# Patient Record
Sex: FEMALE | Race: WHITE | HISPANIC OR LATINO | URBAN - METROPOLITAN AREA
[De-identification: names, ages, dates, MRNs, and addresses within clinical notes are randomized per-mention and may not be internally consistent; named-entity substitution may affect disease eponyms.]

---

## 2023-07-31 ENCOUNTER — APPOINTMENT (OUTPATIENT)
Age: 43
End: 2023-07-31
Payer: COMMERCIAL

## 2023-07-31 ENCOUNTER — OFFICE VISIT (OUTPATIENT)
Age: 43
End: 2023-07-31
Payer: COMMERCIAL

## 2023-07-31 VITALS
HEART RATE: 67 BPM | OXYGEN SATURATION: 97 % | RESPIRATION RATE: 16 BRPM | BODY MASS INDEX: 28.24 KG/M2 | TEMPERATURE: 97 F | DIASTOLIC BLOOD PRESSURE: 70 MMHG | SYSTOLIC BLOOD PRESSURE: 110 MMHG | HEIGHT: 63 IN | WEIGHT: 159.4 LBS

## 2023-07-31 DIAGNOSIS — M25.551 RIGHT HIP PAIN: ICD-10-CM

## 2023-07-31 DIAGNOSIS — M25.551 RIGHT HIP PAIN: Primary | ICD-10-CM

## 2023-07-31 PROCEDURE — 99213 OFFICE O/P EST LOW 20 MIN: CPT | Performed by: PHYSICIAN ASSISTANT

## 2023-07-31 PROCEDURE — 73502 X-RAY EXAM HIP UNI 2-3 VIEWS: CPT

## 2023-07-31 RX ORDER — MELOXICAM 15 MG/1
15 TABLET ORAL DAILY
Qty: 30 TABLET | Refills: 1 | Status: SHIPPED | OUTPATIENT
Start: 2023-07-31

## 2023-07-31 NOTE — PATIENT INSTRUCTIONS
Hip Pain   WHAT YOU NEED TO KNOW:   Hip pain can be caused by a number of conditions, such as bursitis, arthritis, or muscle or tendon strain. You may have swelling in the fluid-filled sacs that protect your muscles and tendons. Hip pain can also be caused by a lower back problem. Hip pain may be caused by trauma, playing sports, or running. Pain may start in your hip and go to your thigh, buttock, or groin. DISCHARGE INSTRUCTIONS:   Medicines:   NSAIDs , such as ibuprofen, help decrease swelling, pain, and fever. This medicine is available with or without a doctor's order. NSAIDs can cause stomach bleeding or kidney problems in certain people. If you take blood thinner medicine, always ask your healthcare provider if NSAIDs are safe for you. Always read the medicine label and follow directions. Take your medicine as directed. Contact your healthcare provider if you think your medicine is not helping or if you have side effects. Tell your provider if you are allergic to any medicine. Keep a list of the medicines, vitamins, and herbs you take. Include the amounts, and when and why you take them. Bring the list or the pill bottles to follow-up visits. Carry your medicine list with you in case of an emergency. Return to the emergency department if:   Your pain gets worse. You have numbness in your leg or toes. You cannot put any weight on or move your hip. Contact your healthcare provider if:   You have a fever. Your pain does not decrease, even after treatment. You have questions or concerns about your condition or care. Follow up with your healthcare provider as directed: You may need physical therapy, an injection, or more testing. You may need to see an orthopedic specialist. Write down your questions so you remember to ask them during your visits.   Manage your hip pain:   Rest  your injured hip so that it can heal. You may need to avoid putting any weight on your hip for at least 48 hours. Return to normal activities as directed. Ice  the injury for 20 minutes every 4 hours, or as directed. Use an ice pack, or put crushed ice in a plastic bag. Cover it with a towel to protect your skin. Ice helps prevent tissue damage and decreases swelling and pain. Elevate  your injured hip above the level of your heart as often as you can. This will help decrease swelling and pain. If possible, prop your hip and leg on pillows or blankets to keep the area elevated comfortably. Maintain a healthy weight. Extra body weight can cause pressure and pain in your hip, knee, and ankle joints. Ask your healthcare provider how much you should weigh. Ask him or her to help you create a weight loss plan if you are overweight. Use assistive devices as directed. You may need to use a cane or crutches. Assistive devices help decrease pain and pressure on your hip when you walk. Ask your healthcare provider for more information about assistive devices and how to use them correctly. © Copyright [employer] 2022 Information is for End User's use only and may not be sold, redistributed or otherwise used for commercial purposes. The above information is an  only. It is not intended as medical advice for individual conditions or treatments. Talk to your doctor, nurse or pharmacist before following any medical regimen to see if it is safe and effective for you.

## 2023-07-31 NOTE — PROGRESS NOTES
[facility]        NAME: Brenda Abdi is a 41 y.o. female  : 1980    MRN: [id number]  DATE: 2023  TIME: 12:43 PM    Assessment and Plan   Right hip pain [M25.551]  1. Right hip pain  meloxicam (Mobic) 15 mg tablet    Ambulatory Referral to Physical Therapy    CANCELED: XR pelvis complete 3+ vw            Patient Instructions     Your preliminary x-rays of your right hip appear normal however we will wait for the official report and if there are any significant findings I will contact you to develop a new treatment plan. Mobic 15 mg 1 tablet once daily. Physical therapy    Follow up with PCP in 3-5 days. Proceed to  ER if symptoms worsen. Chief Complaint     Chief Complaint   Patient presents with   • Hip Pain     Patient fell  a month ago. Complained of pain on her right hip and leg. Patient also stated that she fell again 2 days ago and hurt the same leg. History of Present Illness       Right hip pain s/p fall one month ago and then again 2 days ago. Patient states she first fell at home down the stairs landing on her right buttock and right hip, then two days ago fell at work; patient work's for [employer] as a . 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 Pain is a 8/10, contact, right hip and buttock and does not radiate. Alleviated with cold compresses, Advil, and warm showers however, pain returns, pain is sharp. Aggravated when lying on her belly, when lifting  Up after sitting down. Reports having right hip pain x one year prior to her fall 1 month ago. Patient denies numbness, paresthesia, or weakness. Denies bladder or bowel dysfunction. LMP: Perimenopausal        Review of Systems   Review of Systems   Constitutional: Negative for activity change, appetite change and fever. Respiratory: Negative for cough. Cardiovascular: Negative for chest pain and palpitations.    Gastrointestinal: Negative for nausea and vomiting. Skin: Negative for color change and wound. Neurological: Negative for dizziness, light-headedness and headaches. Current Medications       Current Outpatient Medications:   •  meloxicam (Mobic) 15 mg tablet, Take 1 tablet (15 mg total) by mouth daily, Disp: 30 tablet, Rfl: 1    Current Allergies     Allergies as of 07/31/2023   • (No Known Allergies)            The following portions of the patient's history were reviewed and updated as appropriate: allergies, current medications, past family history, past medical history, past social history, past surgical history and problem list.     History reviewed. No pertinent past medical history. History reviewed. No pertinent surgical history. History reviewed. No pertinent family history. Medications have been verified. Objective   /70   Pulse 67   Temp (!) 97 °F (36.1 °C)   Resp 16   Ht 5' 3" (1.6 m)   Wt 72.3 kg (159 lb 6.4 oz)   SpO2 97%   BMI 28.24 kg/m²        Physical Exam     Physical Exam  Vitals and nursing note reviewed. Constitutional:       General: She is not in acute distress. Appearance: Normal appearance. She is not ill-appearing. Eyes:      General: No scleral icterus. Extraocular Movements: Extraocular movements intact. Conjunctiva/sclera: Conjunctivae normal.      Pupils: Pupils are equal, round, and reactive to light. Cardiovascular:      Rate and Rhythm: Normal rate and regular rhythm. Pulses: Normal pulses. Pulmonary:      Effort: Pulmonary effort is normal. No respiratory distress. Breath sounds: Normal breath sounds. Musculoskeletal:         General: Normal range of motion. Cervical back: Normal range of motion and neck supple. Skin:     General: Skin is warm and dry. Findings: No bruising, erythema or lesion. Neurological:      General: No focal deficit present. Mental Status: She is alert and oriented to person, place, and time. Coordination: Coordination normal.      Gait: Gait normal.   Psychiatric:         Mood and Affect: Mood normal.         Behavior: Behavior normal.         Thought Content:  Thought content normal.         Judgment: Judgment normal.